# Patient Record
Sex: MALE | Race: WHITE | HISPANIC OR LATINO
[De-identification: names, ages, dates, MRNs, and addresses within clinical notes are randomized per-mention and may not be internally consistent; named-entity substitution may affect disease eponyms.]

---

## 2024-08-30 PROBLEM — Z00.00 ENCOUNTER FOR PREVENTIVE HEALTH EXAMINATION: Status: ACTIVE | Noted: 2024-08-30

## 2024-09-05 ENCOUNTER — APPOINTMENT (OUTPATIENT)
Dept: OTOLARYNGOLOGY | Facility: CLINIC | Age: 39
End: 2024-09-05
Payer: COMMERCIAL

## 2024-09-05 ENCOUNTER — NON-APPOINTMENT (OUTPATIENT)
Age: 39
End: 2024-09-05

## 2024-09-05 VITALS — WEIGHT: 195 LBS | BODY MASS INDEX: 25.84 KG/M2 | HEIGHT: 73 IN

## 2024-09-05 DIAGNOSIS — Z78.9 OTHER SPECIFIED HEALTH STATUS: ICD-10-CM

## 2024-09-05 DIAGNOSIS — J34.2 DEVIATED NASAL SEPTUM: ICD-10-CM

## 2024-09-05 DIAGNOSIS — Z83.3 FAMILY HISTORY OF DIABETES MELLITUS: ICD-10-CM

## 2024-09-05 DIAGNOSIS — H90.5 UNSPECIFIED SENSORINEURAL HEARING LOSS: ICD-10-CM

## 2024-09-05 PROCEDURE — 92567 TYMPANOMETRY: CPT

## 2024-09-05 PROCEDURE — 92557 COMPREHENSIVE HEARING TEST: CPT

## 2024-09-05 PROCEDURE — 99204 OFFICE O/P NEW MOD 45 MIN: CPT

## 2024-09-05 NOTE — ASSESSMENT
[FreeTextEntry1] : It was my impression that he probably had longstanding sensorineural hearing losses but he did not have the speech of someone who  had hearing loss as a child.  I reviewed the findings with him and  recommended a retrocochlear evaluation. I referred him to Dr. Olmos to see if he would be a candidate for cochlear implantation

## 2024-09-05 NOTE — CONSULT LETTER
[FreeTextEntry2] : Jack Bull   [phone] [FreeTextEntry1] :   Dear  Dr. Bull  I had the pleasure of seeing your patient today.   Please see my note below.   Thank you very much for allowing me to participate in the care of your patient.  Gume Landers MD [facility]  [facility]  Sincerely,

## 2024-09-05 NOTE — HISTORY OF PRESENT ILLNESS
[de-identified] : Alex Tsai is a 38 year old male who comes in complaining of Long history of hearing loss is that of gotten worse over the last year.  He denies otalgia otorrhea or vertigo and does not have a significant family history of hearing loss apparently.  The patient had no other ear nose or throat complaints at this visit.

## 2024-09-05 NOTE — PHYSICAL EXAM
[FreeTextEntry1] :  The patient was alert and oriented and in no distress. Voice was clear.  He has the obvious hearing losses  Face: The patient had no facial asymmetry or mass. The skin was unremarkable.  Eyes: The pupils were equal round and reactive to light and accommodation. There was no significant nystagmus or disconjugate gaze noted.  Nose:  The external nose had no significant deformity.  There was no facial tenderness.  On anterior rhinoscopy, the nasal mucosa was clear.  The anterior septum was broadly deflected to the right.  There were no visualized polyps purulence  or masses.  Oral cavity: The oral mucosa was normal. The oral and base of tongue were clear and without mass. The gingival and buccal mucosa were moist and without lesions. The palate moved well. There was no cleft to the palate. There appeared to be good salivary flow.   There was no pus, erythema or mass in the oral cavity.   Ears: The external ears were normal without deformity. The ear canals were clear. The tympanic membranes were intact and normal.  Neck:  The neck was symmetrical. The parotid and submandibular glands were normal without masses. The trachea was midline and there was no unusual crepitus. The thyroid was smooth and nontender and no masses were palpated. There was no significant cervical adenopathy.   Neuro: Neurologically, the patient was awake, alert, and oriented to person, place and time. There were no obvious focal neurologic abnormalities.  Cranial nerves II through XII were grossly intact.   TMJ: The temporomandibular joints were nontender. There was no abnormal crepitus and no significant malocclusion  [de-identified] : An audiogram was done and reviewed with the patient.  This showed that his right ear had an SRT of 65 with a 36% discrimination in his left ear 55 with a 44% discrimination and normal tympanograms

## 2024-09-06 PROBLEM — H90.5 SENSORINEURAL HEARING LOSS (SNHL): Status: ACTIVE | Noted: 2024-09-05

## 2025-01-22 ENCOUNTER — APPOINTMENT (OUTPATIENT)
Dept: OTOLARYNGOLOGY | Facility: CLINIC | Age: 40
End: 2025-01-22